# Patient Record
Sex: MALE | Race: WHITE | NOT HISPANIC OR LATINO | Employment: OTHER | URBAN - METROPOLITAN AREA
[De-identification: names, ages, dates, MRNs, and addresses within clinical notes are randomized per-mention and may not be internally consistent; named-entity substitution may affect disease eponyms.]

---

## 2020-10-26 ENCOUNTER — LAB REQUISITION (OUTPATIENT)
Dept: LAB | Facility: HOSPITAL | Age: 70
End: 2020-10-26

## 2020-10-26 DIAGNOSIS — Z00.00 ENCOUNTER FOR GENERAL ADULT MEDICAL EXAMINATION WITHOUT ABNORMAL FINDINGS: ICD-10-CM

## 2020-10-26 LAB
ALBUMIN SERPL-MCNC: 3.1 G/DL (ref 3.5–5.2)
ALBUMIN/GLOB SERPL: 1 G/DL
ALP SERPL-CCNC: 73 U/L (ref 39–117)
ALT SERPL W P-5'-P-CCNC: 15 U/L (ref 1–41)
ANION GAP SERPL CALCULATED.3IONS-SCNC: 10 MMOL/L (ref 5–15)
AST SERPL-CCNC: 17 U/L (ref 1–40)
BASOPHILS # BLD AUTO: 0 10*3/MM3 (ref 0–0.2)
BASOPHILS NFR BLD AUTO: 1.3 % (ref 0–1.5)
BILIRUB SERPL-MCNC: 0.5 MG/DL (ref 0–1.2)
BUN SERPL-MCNC: 23 MG/DL (ref 8–23)
BUN/CREAT SERPL: 16 (ref 7–25)
CALCIUM SPEC-SCNC: 8.1 MG/DL (ref 8.6–10.5)
CHLORIDE SERPL-SCNC: 105 MMOL/L (ref 98–107)
CO2 SERPL-SCNC: 21 MMOL/L (ref 22–29)
CREAT SERPL-MCNC: 1.44 MG/DL (ref 0.76–1.27)
DEPRECATED RDW RBC AUTO: 43.3 FL (ref 37–54)
EOSINOPHIL # BLD AUTO: 0.1 10*3/MM3 (ref 0–0.4)
EOSINOPHIL NFR BLD AUTO: 3.2 % (ref 0.3–6.2)
ERYTHROCYTE [DISTWIDTH] IN BLOOD BY AUTOMATED COUNT: 13.9 % (ref 12.3–15.4)
GFR SERPL CREATININE-BSD FRML MDRD: 48 ML/MIN/1.73
GFR SERPL CREATININE-BSD FRML MDRD: 59 ML/MIN/1.73
GLOBULIN UR ELPH-MCNC: 3.1 GM/DL
GLUCOSE SERPL-MCNC: 192 MG/DL (ref 65–99)
HCT VFR BLD AUTO: 44.7 % (ref 37.5–51)
HGB BLD-MCNC: 15.1 G/DL (ref 13–17.7)
LYMPHOCYTES # BLD AUTO: 1.5 10*3/MM3 (ref 0.7–3.1)
LYMPHOCYTES NFR BLD AUTO: 40.2 % (ref 19.6–45.3)
MAGNESIUM SERPL-MCNC: 2.3 MG/DL (ref 1.6–2.4)
MCH RBC QN AUTO: 30.6 PG (ref 26.6–33)
MCHC RBC AUTO-ENTMCNC: 33.9 G/DL (ref 31.5–35.7)
MCV RBC AUTO: 90.2 FL (ref 79–97)
MONOCYTES # BLD AUTO: 0.4 10*3/MM3 (ref 0.1–0.9)
MONOCYTES NFR BLD AUTO: 11.1 % (ref 5–12)
NEUTROPHILS NFR BLD AUTO: 1.7 10*3/MM3 (ref 1.7–7)
NEUTROPHILS NFR BLD AUTO: 44.2 % (ref 42.7–76)
NRBC BLD AUTO-RTO: 0.1 /100 WBC (ref 0–0.2)
PHOSPHATE SERPL-MCNC: 3.1 MG/DL (ref 2.5–4.5)
PLATELET # BLD AUTO: 135 10*3/MM3 (ref 140–450)
PMV BLD AUTO: 8.7 FL (ref 6–12)
POTASSIUM SERPL-SCNC: 4.6 MMOL/L (ref 3.5–5.2)
PROT SERPL-MCNC: 6.2 G/DL (ref 6–8.5)
RBC # BLD AUTO: 4.96 10*6/MM3 (ref 4.14–5.8)
SODIUM SERPL-SCNC: 136 MMOL/L (ref 136–145)
WBC # BLD AUTO: 3.8 10*3/MM3 (ref 3.4–10.8)

## 2020-10-26 PROCEDURE — 85025 COMPLETE CBC W/AUTO DIFF WBC: CPT | Performed by: PHYSICAL MEDICINE & REHABILITATION

## 2020-10-26 PROCEDURE — 83735 ASSAY OF MAGNESIUM: CPT | Performed by: PHYSICAL MEDICINE & REHABILITATION

## 2020-10-26 PROCEDURE — 84100 ASSAY OF PHOSPHORUS: CPT | Performed by: PHYSICAL MEDICINE & REHABILITATION

## 2020-10-26 PROCEDURE — 80053 COMPREHEN METABOLIC PANEL: CPT | Performed by: PHYSICAL MEDICINE & REHABILITATION

## 2020-10-27 ENCOUNTER — LAB REQUISITION (OUTPATIENT)
Dept: LAB | Facility: HOSPITAL | Age: 70
End: 2020-10-27

## 2020-10-27 DIAGNOSIS — Z00.00 ENCOUNTER FOR GENERAL ADULT MEDICAL EXAMINATION WITHOUT ABNORMAL FINDINGS: ICD-10-CM

## 2020-10-27 LAB — HBA1C MFR BLD: 10 % (ref 3.5–5.6)

## 2020-10-27 PROCEDURE — 83036 HEMOGLOBIN GLYCOSYLATED A1C: CPT

## 2020-11-02 ENCOUNTER — LAB REQUISITION (OUTPATIENT)
Dept: LAB | Facility: HOSPITAL | Age: 70
End: 2020-11-02

## 2020-11-02 DIAGNOSIS — Z00.00 ENCOUNTER FOR GENERAL ADULT MEDICAL EXAMINATION WITHOUT ABNORMAL FINDINGS: ICD-10-CM

## 2020-11-02 LAB
ANION GAP SERPL CALCULATED.3IONS-SCNC: 9 MMOL/L (ref 5–15)
BUN SERPL-MCNC: 19 MG/DL (ref 8–23)
BUN/CREAT SERPL: 14.8 (ref 7–25)
CALCIUM SPEC-SCNC: 8.7 MG/DL (ref 8.6–10.5)
CHLORIDE SERPL-SCNC: 103 MMOL/L (ref 98–107)
CO2 SERPL-SCNC: 22 MMOL/L (ref 22–29)
CREAT SERPL-MCNC: 1.28 MG/DL (ref 0.76–1.27)
DEPRECATED RDW RBC AUTO: 42.4 FL (ref 37–54)
ERYTHROCYTE [DISTWIDTH] IN BLOOD BY AUTOMATED COUNT: 13.5 % (ref 12.3–15.4)
GFR SERPL CREATININE-BSD FRML MDRD: 56 ML/MIN/1.73
GFR SERPL CREATININE-BSD FRML MDRD: 67 ML/MIN/1.73
GLUCOSE SERPL-MCNC: 188 MG/DL (ref 65–99)
HBA1C MFR BLD: 9.9 % (ref 3.5–5.6)
HCT VFR BLD AUTO: 45.8 % (ref 37.5–51)
HGB BLD-MCNC: 15.1 G/DL (ref 13–17.7)
MCH RBC QN AUTO: 30 PG (ref 26.6–33)
MCHC RBC AUTO-ENTMCNC: 33 G/DL (ref 31.5–35.7)
MCV RBC AUTO: 90.9 FL (ref 79–97)
PLATELET # BLD AUTO: 178 10*3/MM3 (ref 140–450)
PMV BLD AUTO: 9.4 FL (ref 6–12)
POTASSIUM SERPL-SCNC: 4.5 MMOL/L (ref 3.5–5.2)
RBC # BLD AUTO: 5.04 10*6/MM3 (ref 4.14–5.8)
SODIUM SERPL-SCNC: 134 MMOL/L (ref 136–145)
WBC # BLD AUTO: 3.4 10*3/MM3 (ref 3.4–10.8)

## 2020-11-02 PROCEDURE — 85027 COMPLETE CBC AUTOMATED: CPT | Performed by: PHYSICAL MEDICINE & REHABILITATION

## 2020-11-02 PROCEDURE — 80048 BASIC METABOLIC PNL TOTAL CA: CPT | Performed by: PHYSICAL MEDICINE & REHABILITATION

## 2020-11-02 PROCEDURE — 83036 HEMOGLOBIN GLYCOSYLATED A1C: CPT | Performed by: PHYSICAL MEDICINE & REHABILITATION

## 2020-11-09 ENCOUNTER — LAB REQUISITION (OUTPATIENT)
Dept: LAB | Facility: HOSPITAL | Age: 70
End: 2020-11-09

## 2020-11-09 DIAGNOSIS — Z00.00 ENCOUNTER FOR GENERAL ADULT MEDICAL EXAMINATION WITHOUT ABNORMAL FINDINGS: ICD-10-CM

## 2020-11-09 LAB
ANION GAP SERPL CALCULATED.3IONS-SCNC: 9 MMOL/L (ref 5–15)
BUN SERPL-MCNC: 17 MG/DL (ref 8–23)
BUN/CREAT SERPL: 13.6 (ref 7–25)
CALCIUM SPEC-SCNC: 9.3 MG/DL (ref 8.6–10.5)
CHLORIDE SERPL-SCNC: 102 MMOL/L (ref 98–107)
CO2 SERPL-SCNC: 23 MMOL/L (ref 22–29)
CREAT SERPL-MCNC: 1.25 MG/DL (ref 0.76–1.27)
DEPRECATED RDW RBC AUTO: 40.3 FL (ref 37–54)
ERYTHROCYTE [DISTWIDTH] IN BLOOD BY AUTOMATED COUNT: 13 % (ref 12.3–15.4)
GFR SERPL CREATININE-BSD FRML MDRD: 57 ML/MIN/1.73
GFR SERPL CREATININE-BSD FRML MDRD: 69 ML/MIN/1.73
GLUCOSE SERPL-MCNC: 153 MG/DL (ref 65–99)
HCT VFR BLD AUTO: 44.8 % (ref 37.5–51)
HGB BLD-MCNC: 15 G/DL (ref 13–17.7)
MCH RBC QN AUTO: 29.8 PG (ref 26.6–33)
MCHC RBC AUTO-ENTMCNC: 33.4 G/DL (ref 31.5–35.7)
MCV RBC AUTO: 89.3 FL (ref 79–97)
PLATELET # BLD AUTO: 181 10*3/MM3 (ref 140–450)
PMV BLD AUTO: 9.1 FL (ref 6–12)
POTASSIUM SERPL-SCNC: 4.6 MMOL/L (ref 3.5–5.2)
RBC # BLD AUTO: 5.02 10*6/MM3 (ref 4.14–5.8)
SODIUM SERPL-SCNC: 134 MMOL/L (ref 136–145)
WBC # BLD AUTO: 3.5 10*3/MM3 (ref 3.4–10.8)

## 2020-11-09 PROCEDURE — 85027 COMPLETE CBC AUTOMATED: CPT | Performed by: PHYSICAL MEDICINE & REHABILITATION

## 2020-11-09 PROCEDURE — 80048 BASIC METABOLIC PNL TOTAL CA: CPT | Performed by: PHYSICAL MEDICINE & REHABILITATION

## 2020-11-10 ENCOUNTER — LAB REQUISITION (OUTPATIENT)
Dept: LAB | Facility: HOSPITAL | Age: 70
End: 2020-11-10

## 2020-11-10 DIAGNOSIS — I63.9 CEREBRAL INFARCTION, UNSPECIFIED (HCC): ICD-10-CM

## 2020-11-10 LAB — TROPONIN T SERPL-MCNC: <0.01 NG/ML (ref 0–0.03)

## 2020-11-10 PROCEDURE — 84484 ASSAY OF TROPONIN QUANT: CPT | Performed by: PHYSICAL MEDICINE & REHABILITATION

## 2022-03-23 ENCOUNTER — APPOINTMENT (OUTPATIENT)
Dept: GENERAL RADIOLOGY | Facility: HOSPITAL | Age: 72
End: 2022-03-23

## 2022-03-23 ENCOUNTER — APPOINTMENT (OUTPATIENT)
Dept: CT IMAGING | Facility: HOSPITAL | Age: 72
End: 2022-03-23

## 2022-03-23 ENCOUNTER — HOSPITAL ENCOUNTER (EMERGENCY)
Facility: HOSPITAL | Age: 72
Discharge: HOME | End: 2022-03-24
Attending: EMERGENCY MEDICINE | Admitting: EMERGENCY MEDICINE

## 2022-03-23 DIAGNOSIS — R41.0 CONFUSION: Primary | ICD-10-CM

## 2022-03-23 LAB
ACETONE BLD QL: NEGATIVE
ALBUMIN SERPL-MCNC: 3.9 G/DL (ref 3.5–5.2)
ALBUMIN/GLOB SERPL: 1.2 G/DL
ALP SERPL-CCNC: 104 U/L (ref 39–117)
ALT SERPL W P-5'-P-CCNC: 21 U/L (ref 1–41)
AMMONIA BLD-SCNC: 16 UMOL/L (ref 16–60)
AMPHET+METHAMPHET UR QL: NEGATIVE
ANION GAP SERPL CALCULATED.3IONS-SCNC: 16.4 MMOL/L (ref 5–15)
AST SERPL-CCNC: 21 U/L (ref 1–40)
BACTERIA UR QL AUTO: ABNORMAL /HPF
BARBITURATES UR QL SCN: NEGATIVE
BASOPHILS # BLD AUTO: 0.04 10*3/MM3 (ref 0–0.2)
BASOPHILS NFR BLD AUTO: 0.5 % (ref 0–1.5)
BENZODIAZ UR QL SCN: NEGATIVE
BILIRUB SERPL-MCNC: 0.4 MG/DL (ref 0–1.2)
BILIRUB UR QL STRIP: NEGATIVE
BUN SERPL-MCNC: 27 MG/DL (ref 8–23)
BUN/CREAT SERPL: 17.4 (ref 7–25)
CALCIUM SPEC-SCNC: 9.3 MG/DL (ref 8.6–10.5)
CANNABINOIDS SERPL QL: NEGATIVE
CHLORIDE SERPL-SCNC: 103 MMOL/L (ref 98–107)
CLARITY UR: CLEAR
CO2 SERPL-SCNC: 20.6 MMOL/L (ref 22–29)
COCAINE UR QL: NEGATIVE
COLOR UR: YELLOW
CREAT SERPL-MCNC: 1.55 MG/DL (ref 0.76–1.27)
D-LACTATE SERPL-SCNC: 1.4 MMOL/L (ref 0.5–2)
D-LACTATE SERPL-SCNC: 2.7 MMOL/L (ref 0.5–2)
DEPRECATED RDW RBC AUTO: 36.5 FL (ref 37–54)
EGFRCR SERPLBLD CKD-EPI 2021: 47.6 ML/MIN/1.73
EOSINOPHIL # BLD AUTO: 0.06 10*3/MM3 (ref 0–0.4)
EOSINOPHIL NFR BLD AUTO: 0.7 % (ref 0.3–6.2)
ERYTHROCYTE [DISTWIDTH] IN BLOOD BY AUTOMATED COUNT: 12.5 % (ref 12.3–15.4)
ETHANOL BLD-MCNC: <10 MG/DL (ref 0–10)
ETHANOL UR QL: <0.01 %
FLUAV AG NPH QL: NEGATIVE
FLUBV AG NPH QL IA: NEGATIVE
GLOBULIN UR ELPH-MCNC: 3.3 GM/DL
GLUCOSE BLDC GLUCOMTR-MCNC: 236 MG/DL (ref 70–99)
GLUCOSE SERPL-MCNC: 204 MG/DL (ref 65–99)
GLUCOSE UR STRIP-MCNC: ABNORMAL MG/DL
HCT VFR BLD AUTO: 46.8 % (ref 37.5–51)
HGB BLD-MCNC: 16.8 G/DL (ref 13–17.7)
HGB UR QL STRIP.AUTO: ABNORMAL
HYALINE CASTS UR QL AUTO: ABNORMAL /LPF
IMM GRANULOCYTES # BLD AUTO: 0.03 10*3/MM3 (ref 0–0.05)
IMM GRANULOCYTES NFR BLD AUTO: 0.4 % (ref 0–0.5)
INR PPP: 1.02 (ref 2–3)
KETONES UR QL STRIP: ABNORMAL
LEUKOCYTE ESTERASE UR QL STRIP.AUTO: NEGATIVE
LYMPHOCYTES # BLD AUTO: 1.86 10*3/MM3 (ref 0.7–3.1)
LYMPHOCYTES NFR BLD AUTO: 21.9 % (ref 19.6–45.3)
MAGNESIUM SERPL-MCNC: 1.8 MG/DL (ref 1.6–2.4)
MCH RBC QN AUTO: 29.2 PG (ref 26.6–33)
MCHC RBC AUTO-ENTMCNC: 35.9 G/DL (ref 31.5–35.7)
MCV RBC AUTO: 81.3 FL (ref 79–97)
METHADONE UR QL SCN: NEGATIVE
MONOCYTES # BLD AUTO: 0.93 10*3/MM3 (ref 0.1–0.9)
MONOCYTES NFR BLD AUTO: 11 % (ref 5–12)
NEUTROPHILS NFR BLD AUTO: 5.56 10*3/MM3 (ref 1.7–7)
NEUTROPHILS NFR BLD AUTO: 65.5 % (ref 42.7–76)
NITRITE UR QL STRIP: NEGATIVE
NRBC BLD AUTO-RTO: 0 /100 WBC (ref 0–0.2)
NT-PROBNP SERPL-MCNC: 170.2 PG/ML (ref 0–900)
OPIATES UR QL: NEGATIVE
OXYCODONE UR QL SCN: NEGATIVE
PH UR STRIP.AUTO: 5.5 [PH] (ref 5–8)
PLATELET # BLD AUTO: 174 10*3/MM3 (ref 140–450)
PMV BLD AUTO: 10.7 FL (ref 6–12)
POTASSIUM SERPL-SCNC: 3.8 MMOL/L (ref 3.5–5.2)
PROT SERPL-MCNC: 7.2 G/DL (ref 6–8.5)
PROT UR QL STRIP: ABNORMAL
PROTHROMBIN TIME: 10.7 SECONDS (ref 9.4–12)
RBC # BLD AUTO: 5.76 10*6/MM3 (ref 4.14–5.8)
RBC # UR STRIP: ABNORMAL /HPF
REF LAB TEST METHOD: ABNORMAL
SODIUM SERPL-SCNC: 140 MMOL/L (ref 136–145)
SP GR UR STRIP: 1.02 (ref 1–1.03)
SQUAMOUS #/AREA URNS HPF: ABNORMAL /HPF
T4 FREE SERPL-MCNC: 1.29 NG/DL (ref 0.93–1.7)
TROPONIN T SERPL-MCNC: 0.02 NG/ML (ref 0–0.03)
TSH SERPL DL<=0.05 MIU/L-ACNC: 3.59 UIU/ML (ref 0.27–4.2)
UROBILINOGEN UR QL STRIP: ABNORMAL
WBC # UR STRIP: ABNORMAL /HPF
WBC NRBC COR # BLD: 8.48 10*3/MM3 (ref 3.4–10.8)

## 2022-03-23 PROCEDURE — 80307 DRUG TEST PRSMV CHEM ANLYZR: CPT | Performed by: EMERGENCY MEDICINE

## 2022-03-23 PROCEDURE — 71045 X-RAY EXAM CHEST 1 VIEW: CPT

## 2022-03-23 PROCEDURE — 82077 ASSAY SPEC XCP UR&BREATH IA: CPT | Performed by: EMERGENCY MEDICINE

## 2022-03-23 PROCEDURE — U0005 INFEC AGEN DETEC AMPLI PROBE: HCPCS | Performed by: EMERGENCY MEDICINE

## 2022-03-23 PROCEDURE — 83605 ASSAY OF LACTIC ACID: CPT | Performed by: EMERGENCY MEDICINE

## 2022-03-23 PROCEDURE — 85025 COMPLETE CBC W/AUTO DIFF WBC: CPT | Performed by: EMERGENCY MEDICINE

## 2022-03-23 PROCEDURE — 84443 ASSAY THYROID STIM HORMONE: CPT | Performed by: EMERGENCY MEDICINE

## 2022-03-23 PROCEDURE — 83880 ASSAY OF NATRIURETIC PEPTIDE: CPT | Performed by: EMERGENCY MEDICINE

## 2022-03-23 PROCEDURE — 82009 KETONE BODYS QUAL: CPT | Performed by: EMERGENCY MEDICINE

## 2022-03-23 PROCEDURE — 82962 GLUCOSE BLOOD TEST: CPT

## 2022-03-23 PROCEDURE — 93010 ELECTROCARDIOGRAM REPORT: CPT | Performed by: INTERNAL MEDICINE

## 2022-03-23 PROCEDURE — U0004 COV-19 TEST NON-CDC HGH THRU: HCPCS | Performed by: EMERGENCY MEDICINE

## 2022-03-23 PROCEDURE — 84439 ASSAY OF FREE THYROXINE: CPT | Performed by: EMERGENCY MEDICINE

## 2022-03-23 PROCEDURE — 85610 PROTHROMBIN TIME: CPT | Performed by: EMERGENCY MEDICINE

## 2022-03-23 PROCEDURE — 93005 ELECTROCARDIOGRAM TRACING: CPT | Performed by: EMERGENCY MEDICINE

## 2022-03-23 PROCEDURE — 80053 COMPREHEN METABOLIC PANEL: CPT | Performed by: EMERGENCY MEDICINE

## 2022-03-23 PROCEDURE — 83735 ASSAY OF MAGNESIUM: CPT | Performed by: EMERGENCY MEDICINE

## 2022-03-23 PROCEDURE — 70450 CT HEAD/BRAIN W/O DYE: CPT

## 2022-03-23 PROCEDURE — 82140 ASSAY OF AMMONIA: CPT | Performed by: EMERGENCY MEDICINE

## 2022-03-23 PROCEDURE — 81001 URINALYSIS AUTO W/SCOPE: CPT | Performed by: EMERGENCY MEDICINE

## 2022-03-23 PROCEDURE — 99284 EMERGENCY DEPT VISIT MOD MDM: CPT

## 2022-03-23 PROCEDURE — 84484 ASSAY OF TROPONIN QUANT: CPT | Performed by: EMERGENCY MEDICINE

## 2022-03-23 PROCEDURE — 87804 INFLUENZA ASSAY W/OPTIC: CPT | Performed by: EMERGENCY MEDICINE

## 2022-03-23 PROCEDURE — 87086 URINE CULTURE/COLONY COUNT: CPT | Performed by: EMERGENCY MEDICINE

## 2022-03-23 RX ORDER — SODIUM CHLORIDE 0.9 % (FLUSH) 0.9 %
10 SYRINGE (ML) INJECTION AS NEEDED
Status: DISCONTINUED | OUTPATIENT
Start: 2022-03-23 | End: 2022-03-24 | Stop reason: HOSPADM

## 2022-03-23 NOTE — ED TRIAGE NOTES
"PT states lives alone in house in [place], but hasn't been there in months. Now staying with step daughter (Claudine Vaca). EMS called to scene for welfare check due to pt walking street and \"seemed confused\". Pt oriented to person, place and time, slightly disoriented to situation. Per EMS pt attempted to refuse treatment saying he was going home, but passed house he was supposed to be living in.    "

## 2022-03-23 NOTE — ED PROVIDER NOTES
Time: 6:32 PM EDT  Arrived by: ambulance  Chief Complaint: Confusion  History provided by: EMS  History is limited by: Confusion      History of Present Illness:  Patient is a 71 y.o. year old male that presents to the emergency department with confusion.  The patient was found wandering outside.  The patient appeared to be confused.  EMS was called for a welfare check.  According to EMS, the patient typically lives in [place] but has been living with his ex wife's daughter as of recently.   Upon presentation we are unable to get a hold of the ex-wife's daughter.  The patient does not know the ex-wife's daughter's phone number.  Patient denies headache.  The patient denies, nausea, vomiting, fever, myalgias.  The patient denies any chest pain or shortness of breath.  The patient denies any abdominal pain.  The patient states that he takes a lot of medication but does not know the names of them.  The patient does know that he is a diabetic.  Patient also notes that he has had strokes in the past.      Similar Symptoms Previously: Unknown  Recently seen: Unknown      Patient Care Team  Primary Care Provider: Provider, No Known    Past Medical History:     Allergies   Allergen Reactions   • Penicillins Unknown - Low Severity     Past Medical History:   Diagnosis Date   • Diabetes mellitus (HCC)    • Hypertension    • Stroke (HCC)      History reviewed. No pertinent surgical history.  History reviewed. No pertinent family history.    Home Medications:  Prior to Admission medications    Not on File        Social History:   Social History     Tobacco Use   • Smoking status: Never Smoker   • Smokeless tobacco: Never Used   Substance Use Topics   • Alcohol use: Yes     Comment: none lately   • Drug use: Not Currently          Record Review:  I have reviewed the patient's records in [facility].     Review of Systems:  Review of Systems   Reason unable to perform ROS: Review of systems appears to be unreliable due to  "confusion.   Constitutional: Negative for chills, diaphoresis, fatigue and fever.   HENT: Negative for congestion, postnasal drip, rhinorrhea and sore throat.    Eyes: Negative for photophobia.   Respiratory: Negative for cough, chest tightness and shortness of breath.    Cardiovascular: Negative for chest pain, palpitations and leg swelling.   Gastrointestinal: Negative for abdominal pain, diarrhea, nausea and vomiting.   Genitourinary: Negative for difficulty urinating, dysuria, flank pain, frequency, hematuria and urgency.   Musculoskeletal: Negative for neck pain and neck stiffness.   Skin: Negative for pallor and rash.   Neurological: Negative for dizziness, syncope, facial asymmetry, speech difficulty, weakness, numbness and headaches.   Hematological: Negative for adenopathy. Does not bruise/bleed easily.   Psychiatric/Behavioral: Positive for confusion.           Physical Exam:  BP (!) 147/105   Pulse 106   Temp 98.8 °F (37.1 °C) (Oral)   Resp 18   Ht 172.7 cm (68\")   Wt 76.7 kg (169 lb 1.5 oz)   SpO2 93%   BMI 25.71 kg/m²     Physical Exam  Vitals and nursing note reviewed.   Constitutional:       General: He is not in acute distress.     Appearance: He is obese. He is not ill-appearing, toxic-appearing or diaphoretic.      Comments: The patient is very disheveled.  The patient is poorly groomed.  The patient is covered in feces from chest to hands to belly and leg.  Patient also appears to have urinary incontinence as well   HENT:      Head: Normocephalic and atraumatic.      Mouth/Throat:      Mouth: Mucous membranes are moist.   Eyes:      Pupils: Pupils are equal, round, and reactive to light.   Cardiovascular:      Rate and Rhythm: Normal rate and regular rhythm.      Pulses: Normal pulses.      Heart sounds: Normal heart sounds. No murmur heard.  Pulmonary:      Effort: Pulmonary effort is normal. No accessory muscle usage, respiratory distress or retractions.      Breath sounds: Normal breath " sounds. No wheezing, rhonchi or rales.   Abdominal:      General: Abdomen is flat. There is no distension.      Palpations: Abdomen is soft. There is no mass.      Tenderness: There is no abdominal tenderness. There is no right CVA tenderness, left CVA tenderness, guarding or rebound.      Comments: No rigidity   Musculoskeletal:         General: No swelling, tenderness or deformity.      Cervical back: Neck supple. No rigidity or tenderness. No pain with movement.      Right lower leg: No edema.      Left lower leg: No edema.   Lymphadenopathy:      Cervical: No cervical adenopathy.   Skin:     General: Skin is warm and dry.      Capillary Refill: Capillary refill takes less than 2 seconds.      Coloration: Skin is not jaundiced or pale.      Findings: No erythema.   Neurological:      General: No focal deficit present.      Mental Status: He is alert. He is confused.      Cranial Nerves: No cranial nerve deficit, dysarthria or facial asymmetry.      Sensory: No sensory deficit.      Motor: No weakness.      Coordination: Coordination normal.      Gait: Gait is intact.   Psychiatric:         Mood and Affect: Mood normal. Mood is not anxious.         Behavior: Behavior normal. Behavior is not agitated.         Cognition and Memory: Cognition is impaired. Memory is impaired.                Medications in the Emergency Department:  Medications   sodium chloride 0.9 % flush 10 mL (has no administration in time range)        Labs  Lab Results (last 24 hours)     Procedure Component Value Units Date/Time    POC Glucose Once [[id number]]  (Abnormal) Collected: 03/23/22 1738    Specimen: Blood Updated: 03/23/22 1739     Glucose 236 mg/dL      Comment: Serial Number: [id number]:  [id number]       Acetone [[id number]]  (Normal) Collected: 03/23/22 1902    Specimen: Blood Updated: 03/23/22 2027     Acetone Negative    Ammonia [[id number]]  (Normal) Collected: 03/23/22 1902    Specimen: Blood Updated: 03/23/22 1955      Ammonia 16 umol/L     CBC & Differential [[id number]]  (Abnormal) Collected: 03/23/22 1902    Specimen: Blood Updated: 03/23/22 1928    Narrative:      The following orders were created for panel order CBC & Differential.  Procedure                               Abnormality         Status                     ---------                               -----------         ------                     CBC Auto Differential[[id number]]        Abnormal            Final result                 Please view results for these tests on the individual orders.    Comprehensive Metabolic Panel [[id number]]  (Abnormal) Collected: 03/23/22 1902    Specimen: Blood Updated: 03/23/22 1954     Glucose 204 mg/dL      BUN 27 mg/dL      Creatinine 1.55 mg/dL      Sodium 140 mmol/L      Potassium 3.8 mmol/L      Chloride 103 mmol/L      CO2 20.6 mmol/L      Calcium 9.3 mg/dL      Total Protein 7.2 g/dL      Albumin 3.90 g/dL      ALT (SGPT) 21 U/L      AST (SGOT) 21 U/L      Alkaline Phosphatase 104 U/L      Total Bilirubin 0.4 mg/dL      Globulin 3.3 gm/dL      A/G Ratio 1.2 g/dL      BUN/Creatinine Ratio 17.4     Anion Gap 16.4 mmol/L      eGFR 47.6 mL/min/1.73      Comment: National Kidney Foundation and American Society of Nephrology (ASN) Task Force recommended calculation based on the Chronic Kidney Disease Epidemiology Collaboration (CKD-EPI) equation refit without adjustment for race.       Narrative:      GFR Normal >60  Chronic Kidney Disease <60  Kidney Failure <15      Lactic Acid, Plasma [[id number]]  (Abnormal) Collected: 03/23/22 1902    Specimen: Blood Updated: 03/23/22 2009     Lactate 2.7 mmol/L     Magnesium [[id number]]  (Normal) Collected: 03/23/22 1902    Specimen: Blood Updated: 03/23/22 1954     Magnesium 1.8 mg/dL     Protime-INR [[id number]]  (Abnormal) Collected: 03/23/22 1902    Specimen: Blood Updated: 03/23/22 1932     Protime 10.7 Seconds      INR 1.02    Narrative:      Suggested Therapeutic Ranges For Oral  Anticoagulant Therapy:  Level of Therapy                      INR Target Range  Standard Dose                            2.0-3.0  High Dose                                2.5-3.5  Patients not receiving anticoagulant  Therapy Normal Range                     0.6-1.2    T4, Free [[id number]]  (Normal) Collected: 03/23/22 1902    Specimen: Blood Updated: 03/23/22 1958     Free T4 1.29 ng/dL     Narrative:      Results may be falsely increased if patient taking Biotin.      TSH [[id number]]  (Normal) Collected: 03/23/22 1902    Specimen: Blood Updated: 03/23/22 1958     TSH 3.590 uIU/mL     Troponin [[id number]]  (Normal) Collected: 03/23/22 1902    Specimen: Blood Updated: 03/23/22 1958     Troponin T 0.019 ng/mL     Narrative:      Troponin T Reference Range:  <= 0.03 ng/mL-   Negative for AMI  >0.03 ng/mL-     Abnormal for myocardial necrosis.  Clinicians would have to utilize clinical acumen, EKG, Troponin and serial changes to determine if it is an Acute Myocardial Infarction or myocardial injury due to an underlying chronic condition.       Results may be falsely decreased if patient taking Biotin.      Ethanol [[id number]] Collected: 03/23/22 1902    Specimen: Blood Updated: 03/23/22 1954     Ethanol <10 mg/dL      Ethanol % <0.010 %     Narrative:      Ethanol (Plasma)  <10 Essentially Negative    Toxic Concentrations           mg/dL    Flushing, slowing of reflexes    Impaired visual activity         Depression of CNS              >100  Possible Coma                  >300       CBC Auto Differential [[id number]]  (Abnormal) Collected: 03/23/22 1902    Specimen: Blood Updated: 03/23/22 1928     WBC 8.48 10*3/mm3      RBC 5.76 10*6/mm3      Hemoglobin 16.8 g/dL      Hematocrit 46.8 %      MCV 81.3 fL      MCH 29.2 pg      MCHC 35.9 g/dL      RDW 12.5 %      RDW-SD 36.5 fl      MPV 10.7 fL      Platelets 174 10*3/mm3      Neutrophil % 65.5 %      Lymphocyte % 21.9 %      Monocyte % 11.0 %      Eosinophil %  0.7 %      Basophil % 0.5 %      Immature Grans % 0.4 %      Neutrophils, Absolute 5.56 10*3/mm3      Lymphocytes, Absolute 1.86 10*3/mm3      Monocytes, Absolute 0.93 10*3/mm3      Eosinophils, Absolute 0.06 10*3/mm3      Basophils, Absolute 0.04 10*3/mm3      Immature Grans, Absolute 0.03 10*3/mm3      nRBC 0.0 /100 WBC     BNP [[id number]]  (Normal) Collected: 03/23/22 1902    Specimen: Blood Updated: 03/23/22 2121     proBNP 170.2 pg/mL     Narrative:      Among patients with dyspnea, NT-proBNP is highly sensitive for the detection of acute congestive heart failure. In addition NT-proBNP of <300 pg/ml effectively rules out acute congestive heart failure with 99% negative predictive value.    Results may be falsely decreased if patient taking Biotin.      Urine Drug Screen - Urine, Clean Catch [[id number]]  (Normal) Collected: 03/23/22 2129    Specimen: Urine, Clean Catch Updated: 03/23/22 2212     Amphet/Methamphet, Screen Negative     Barbiturates Screen, Urine Negative     Benzodiazepine Screen, Urine Negative     Cocaine Screen, Urine Negative     Opiate Screen Negative     THC, Screen, Urine Negative     Methadone Screen, Urine Negative     Oxycodone Screen, Urine Negative    Narrative:      Negative Thresholds Per Drugs Screened:    Amphetamines                 500 ng/ml  Barbiturates                 200 ng/ml  Benzodiazepines              100 ng/ml  Cocaine                      300 ng/ml  Methadone                    300 ng/ml  Opiates                      300 ng/ml  Oxycodone                    100 ng/ml  THC                           50 ng/ml    The Normal Value for all drugs tested is negative. This report includes final unconfirmed screening results to be used for medical treatment purposes only. Unconfirmed results must not be used for non-medical purposes such as employment or legal testing. Clinical consideration should be applied to any drug of abuse test, particularly when unconfirmed results are  used.            Urinalysis With Culture If Indicated - Urine, Clean Catch [[id number]]  (Abnormal) Collected: 03/23/22 2129    Specimen: Urine, Clean Catch Updated: 03/23/22 2255     Color, UA Yellow     Appearance, UA Clear     pH, UA 5.5     Specific Gravity, UA 1.017     Glucose, UA >=1000 mg/dL (3+)     Ketones, UA Trace     Bilirubin, UA Negative     Blood, UA Trace     Protein, UA >=300 mg/dL (3+)     Leuk Esterase, UA Negative     Nitrite, UA Negative     Urobilinogen, UA 1.0 E.U./dL    Urinalysis, Microscopic Only - Urine, Clean Catch [[id number]]  (Abnormal) Collected: 03/23/22 2129    Specimen: Urine, Clean Catch Updated: 03/23/22 2255     RBC, UA 0-2 /HPF      WBC, UA 6-12 /HPF      Bacteria, UA None Seen /HPF      Squamous Epithelial Cells, UA 0-2 /HPF      Hyaline Casts, UA 0-2 /LPF      Methodology Automated Microscopy    Urine Culture - Urine, Urine, Clean Catch [[id number]] Collected: 03/23/22 2129    Specimen: Urine, Clean Catch Updated: 03/23/22 2255    Influenza Antigen, Rapid - Swab, Nasopharynx [[id number]]  (Normal) Collected: 03/23/22 2130    Specimen: Swab from Nasopharynx Updated: 03/23/22 2214     Influenza A Ag, EIA Negative     Influenza B Ag, EIA Negative    COVID-19,APTIMA PANTHER(HELLEN),[facility], NP/OP SWAB IN UTM/VTM/[facility]SPORT MEDIA,24 HR TAT - Swab, Nasopharynx [[id number]] Collected: 03/23/22 2130    Specimen: Swab from Nasopharynx Updated: 03/23/22 2144    STAT Lactic Acid, Reflex [[id number]]  (Normal) Collected: 03/23/22 2226    Specimen: Blood Updated: 03/23/22 2254     Lactate 1.4 mmol/L            Imaging:  CT Head Without Contrast   Final Result      XR Chest 1 View   Final Result          Procedures:  Procedures    Progress  ED Course as of 03/24/22 0134   Wed Mar 23, 2022   1937 EKG:    Rhythm: Sinus tachycardia  Rate: 110  Intervals: Prolonged prolonged TX interval, normal QT interval  T-wave: Nonspecific T wave flattening  ST Segment: No obvious pathological ST  elevation or ST depression    EKG Comparison: Unavailable    Interpreted by me   [SD]      ED Course User Index  [SD] Luis Blancas DO                            Medical Decision Making:  MDM  Number of Diagnoses or Management Options  Confusion  Diagnosis management comments:       The patient was found today outside wandering.  The patient appeared to be confused and soiled and thus EMS was called.  EMS brought the patient in for welfare check.  Upon arrival.  The patient was pleasantly and mildly confused.  Really denied any symptoms.  Patient had no obvious focal neurological deficits on exam.  The patient was afebrile.  The patient vital signs are stable.  Patient does note that he has had multiple strokes in the past and has diabetes.Patient's evaluation here demonstrated a urine that only had 6-12 WBCs but negative bacteria and negative nitrite.  The patient's urine toxicology was negative.  The patient had a negative acetone.  The patient's troponin was 0.019 which is in the normal range the patient had a normal free T4 and TSH level.  Patient's alcohol level was 0.  The patient's BNP was normal.  The patient had a normal ammonia level.  The patient's magnesium was normal.  The patient's pro time was normal at 10.7.  The patient had a normal CBC including normal white blood cell count and hemoglobin.  The patient's chemistry demonstrated glucose of 204, BUN of 27 creatinine 1.55.  The patient's bicarb was 20.6.  The patient had no signs of nonketotic hyperosmolar syndrome.  The patient did not appear to be in DKA as well as the patient had negative ketones.  The patient's EKG demonstrated a sinus tachycardia with a rate of 110 but no other acute abnormalities.  Patient's chest x-ray demonstrated mild basilar infiltrates.  The patient clinically did not have pneumonia as he did not have a fever, elevated white blood cell count or any respiratory symptoms.  The patient was tested for COVID-19.  The patient  will stay quarantined at home until he can review those COVID-19 results with his primary care physician.  CAT scan of the brain was ordered which demonstrated no obvious acute stroke or bleed.      The patient's ex-wife Darcie came to the emergency room.  The patient's ex-wife states that the [place]  The ex-wife states that he has been staying there since the patient's had multiple strokes and has suffered from confusion.  Shaheen states that the patient is definitely at his baseline.  He has suffered from confusion since the strokes.  She states that he often is noncompliant with his medicine and is difficult to get him to do things.  She feels very comfortable taking the patient home as she feels that he has been this way for 2 years.  The clinical  has consulted Adult Protective Services who will follow up with the patient tomorrow.  The patient will also follow-up with his primary care physician tomorrow.  Patient appears appropriate for discharge with close outpatient follow-up and evaluation.       Amount and/or Complexity of Data Reviewed  Clinical lab tests: reviewed  Tests in the radiology section of CPT®: reviewed  Tests in the medicine section of CPT®: reviewed               Final diagnoses:   Confusion        Disposition:  ED Disposition     ED Disposition   Discharge    Condition   Stable    Comment   --             This medical record created using voice recognition software and may contain unintended errors.    DO Yonny Blackwell Scott, DO  03/25/22 0235

## 2022-03-23 NOTE — ED NOTES
Per Dr. Blancas, pt to be decontaminated prior to other orders being completed. Pt currently covered in dried feces. Pt states incontinent of bowel since colonoscopy. Denies incontinence of bladder. PT appears very unkempt, clothes dirty and producing strong odor.

## 2022-03-24 VITALS
WEIGHT: 169.09 LBS | RESPIRATION RATE: 18 BRPM | DIASTOLIC BLOOD PRESSURE: 105 MMHG | HEIGHT: 68 IN | SYSTOLIC BLOOD PRESSURE: 147 MMHG | TEMPERATURE: 98.8 F | HEART RATE: 98 BPM | BODY MASS INDEX: 25.63 KG/M2 | OXYGEN SATURATION: 95 %

## 2022-03-24 LAB
BACTERIA SPEC AEROBE CULT: NO GROWTH
QT INTERVAL: 324 MS
SARS-COV-2 RNA PNL SPEC NAA+PROBE: NOT DETECTED

## 2022-03-24 NOTE — ED NOTES
Elzbieta Vaca (pt's exwife) called to come and take patient home. MD will also talk with Ms. Vaca about pt's baseline cognition.

## 2022-03-24 NOTE — DISCHARGE INSTRUCTIONS
Please follow-up with your doctor tomorrow for reevaluation    Please follow-up tomorrow with Adult Protective Services    Your were tested for COVID-19 today.  Please stay quarantined at home until  you can review your COVID-19 results with your primary care physician and are released from quarantine      Return to emergency room for worsening altered mental status, headache, vomiting, fever, chest pain, shortness of breath, difficulty with speech, difficulty swallowing, change in vision, dizziness, difficulties walking, numbness or weakness in your extremities or any new symptoms.

## 2022-03-24 NOTE — ED NOTES
This RN attempted to call phone number listed on facesheet to contact possible family member. No answer.

## 2022-03-24 NOTE — CASE MANAGEMENT/SOCIAL WORK
"Pt found wandering on street and brought into ED via EMS. Upon arrival pt appeared very unkempt, dried feces all over body and clothes. Pt is alert to self and place but not time or situation. Reports he is cared for by step daughter- Claudine Kemp. And Ex-wife Vivian Rodriguez. Vivian has 2 properties. She lives in one, pt used to live there as well. He now lives in the other property and reports he lives there alone. He stated there wasn't food in the house, so that is why he was out walking. Unable to say when his last shower was. Pt does not know address of where he was staying or where Vivian lives. Pt reports he had a stroke \"a week ago,\" but pt is a poor historian.   TREVOR spoke with Vivian on the phone. She stated she was aware police found him wandering. TREVOR asked if she or claudine would be able to pick him up if cleared for DC, she stated they would.  TREVOR notified provider and nurse.   TREVOR made DCBS report. Report number [id number]   "

## 2022-03-28 NOTE — SIGNIFICANT NOTE
03/28/22 1320   Personal Safety   Personal Safety Comments Report #[id number] was not accepted for further investivation at this time.